# Patient Record
Sex: FEMALE | Race: AMERICAN INDIAN OR ALASKA NATIVE
[De-identification: names, ages, dates, MRNs, and addresses within clinical notes are randomized per-mention and may not be internally consistent; named-entity substitution may affect disease eponyms.]

---

## 2018-05-17 ENCOUNTER — HOSPITAL ENCOUNTER (OUTPATIENT)
Dept: HOSPITAL 5 - OR | Age: 18
Discharge: HOME | End: 2018-05-17
Attending: SURGERY
Payer: COMMERCIAL

## 2018-05-17 VITALS — DIASTOLIC BLOOD PRESSURE: 83 MMHG | SYSTOLIC BLOOD PRESSURE: 127 MMHG

## 2018-05-17 DIAGNOSIS — N60.21: Primary | ICD-10-CM

## 2018-05-17 PROCEDURE — 81025 URINE PREGNANCY TEST: CPT

## 2018-05-17 PROCEDURE — 88305 TISSUE EXAM BY PATHOLOGIST: CPT

## 2018-05-17 PROCEDURE — 19120 REMOVAL OF BREAST LESION: CPT

## 2018-05-17 NOTE — OPERATIVE REPORT
Operative Report


Operative Report: 





Date of Service: May 17, 2018





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 

and right breast mass of the upper inner quadrant excisional biopsy





Postoperative diagnosis: Same





Procedure: Right breast fibroadenoma excisional biopsy and right breast mass 

excisional biopsy of the upper inner quadrant





Surgeon: Brittany Valdez MD





Findings: Known right breast fibroadenoma at the 1 o'clock position 9 cm from 

the nipple and new mass at the 1:00 position 5 cm from the nipple with excision 

performed of both areas through same incision





Complications: None





Drains: None





Estimated blood loss: Minimal





Disposition: PACU in good condition





Indication for operative procedure: This is a 18-year-old lady with known right 

breast fibroadenoma at the 1:00 position-recently biopsied. She wished to 

proceed with an excisional biopsy given symptomatic pain. Patient wished to 

proceed with the above procedure.





The patient was procedure in detail: The patient was taken to the operating 

room and was laid supine. General anesthesia was administered. The right breast 

fibroadenoma was palpable at the 1 o'clock position 9 cm from the nipple. The 

right breast was prepped and draped in the normal sterile operative fashion. 

Ultrasound was used to examine the area of concern. A new additional mass 

probable for a fibroadenoma was noted as well at the 1:00 position 5 cm from 

the nipple around 1 cm and was decided to remove as well given close proximity 

to known fibroadenoma. Timeout was performed. A right upper inner quadrant 

breast incision was made with a 15 blade knife at the 1:00 position with 

dissection taken down to the subcutaneous tissues. The fibroadenoma was 

encountered and was dissected free with the aid of the Bovie cautery. The 

specimen was sent to pathology. Second mass at the 1:00 position 5 cm from the 

nipple was identifed and dissected free with the aid of the Bovie cautery and 

sent to pathology. Hemostasis was then obtained using the Bovie cautery. The 

breast cavity was irrigated and suctioned. The deep breast tissues were 

approximated and closed using interrupted 3-0 Vicryl and skin brought together 

and closed using a running 4-0 Monocryl followed by skin affix. She tolerated 

surgery very well and was awakened from anesthesia without any complication and 

transported to PACU in good condition.

## 2018-05-17 NOTE — ANESTHESIA CONSULTATION
Anesthesia Consult and Med Hx


Date of service: 05/17/18





- Airway


Anesthetic Teeth Evaluation: Good


ROM Head & Neck: Adequate


Mental/Hyoid Distance: Adequate


Mallampati Class: Class I


Intubation Access Assessment: Good





- Pulmonary Exam


CTA: Yes





- Cardiac Exam


Cardiac Exam: RRR





- Pre-Operative Health Status


ASA Pre-Surgery Classification: ASA1


Proposed Anesthetic Plan: General (informed consent obtained)





- Central Nervous System


Hx Psychiatric Problems: No





- Other Systems


Hx Alcohol Use: No


Hx Substance Use: No


Hx Cancer: No

## 2018-05-17 NOTE — SHORT STAY SUMMARY
Short Stay Documentation


Date of service: 05/17/18





- History


H&P: obtained from office





- Allergies and Medications


Current Medications: 


 Allergies





No Known Allergies Allergy (Verified 05/11/18 15:03)


 





 Home Medications











 Medication  Instructions  Recorded  Confirmed  Last Taken  Type


 


Lo Loestrin Fe 1-10 Tablet 1 tab PO DAILY 05/11/18 05/17/18 05/16/18 History


 


HYDROcodone/APAP 5-325 [[place] 1 each PO Q6HR PRN #20 tablet 05/17/18  Unknown Rx





5/325]     








Active Medications





Cefazolin Sodium (Ancef/Sterile Water 2 Gm/20 Ml)  2 gm IV PREOP NR


   Stop: 05/17/18 23:00


Hydromorphone HCl (Dilaudid)  0.25 mg IV Q10MIN PRN


   PRN Reason: Pain, Moderate (4-6)


   Stop: 05/18/18 13:10


Lactated Ringer's (Lactated Ringers)  1,000 mls @ 100 mls/hr IV AS DIRECT COREEN


   Last Admin: 05/17/18 13:44 Dose:  100 mls/hr


Midazolam HCl (Versed)  2 mg IV PREOP NR


   Stop: 05/17/18 23:59


   Last Admin: 05/17/18 13:46 Dose:  2 mg











- Brief post op/procedure progress note


Date of procedure: 05/17/18


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 

right breast mass


Post-op diagnosis: same


Procedure: 





Right breast fibroadenoma excisional biopsy of the upper inner quadrant and 

right breast mass excisional biopsy of the upper inner quadrant


Anesthesia: GETA


Findings: 





Known right breast fibroadenoma at the 1:00 position with excision performed


Surgeon: CORNELL RAIN


Estimated blood loss: minimal


Pathology: list (right fibroadenoma x2)


Specimen disposition: to lab


Condition: stable





- Disposition


Condition at discharge: Good


Disposition: [id number] TO HOME OR SELFCARE





Short Stay Discharge Plan


Activity: other (no heavy lifting)


Diet: regular


Wound: other (keep incision clean and dry; may shower in 24 hours; no baths, 

pools or lakes; do not rub or scrub incision; wear breast binder or sports bra)


Follow up with: 


GERONIMO SHABAZZ MD [Primary Care Provider] - 7 Days


CORNELL RAIN MD [Staff Physician] - 7 Days


Prescriptions: 


HYDROcodone/APAP 5-325 [[place] 5/325] 1 each PO Q6HR PRN #20 tablet


 PRN Reason: Pain